# Patient Record
Sex: FEMALE | Race: WHITE | NOT HISPANIC OR LATINO | Employment: STUDENT | URBAN - METROPOLITAN AREA
[De-identification: names, ages, dates, MRNs, and addresses within clinical notes are randomized per-mention and may not be internally consistent; named-entity substitution may affect disease eponyms.]

---

## 2023-05-15 ENCOUNTER — OFFICE VISIT (OUTPATIENT)
Dept: PRIMARY CARE | Facility: CLINIC | Age: 18
End: 2023-05-15
Payer: COMMERCIAL

## 2023-05-15 VITALS
HEART RATE: 99 BPM | BODY MASS INDEX: 20.37 KG/M2 | DIASTOLIC BLOOD PRESSURE: 70 MMHG | SYSTOLIC BLOOD PRESSURE: 124 MMHG | OXYGEN SATURATION: 96 % | WEIGHT: 134 LBS

## 2023-05-15 DIAGNOSIS — B99.9 RECURRENT INFECTIONS: ICD-10-CM

## 2023-05-15 DIAGNOSIS — Z84.89: ICD-10-CM

## 2023-05-15 DIAGNOSIS — R59.9 ENLARGED LYMPH NODES: Primary | ICD-10-CM

## 2023-05-15 DIAGNOSIS — J35.1 ENLARGED TONSILS: ICD-10-CM

## 2023-05-15 DIAGNOSIS — E61.1 IRON DEFICIENCY: ICD-10-CM

## 2023-05-15 LAB
HIV 1/ 2 AG/AB SCREEN: NONREACTIVE
IRON (UG/DL) IN SER/PLAS: 35 UG/DL (ref 35–150)
IRON BINDING CAPACITY (UG/DL) IN SER/PLAS: 397 UG/DL (ref 240–445)
IRON SATURATION (%) IN SER/PLAS: 9 % (ref 25–45)

## 2023-05-15 PROCEDURE — 83550 IRON BINDING TEST: CPT

## 2023-05-15 PROCEDURE — 1036F TOBACCO NON-USER: CPT | Performed by: STUDENT IN AN ORGANIZED HEALTH CARE EDUCATION/TRAINING PROGRAM

## 2023-05-15 PROCEDURE — 83540 ASSAY OF IRON: CPT

## 2023-05-15 PROCEDURE — 87389 HIV-1 AG W/HIV-1&-2 AB AG IA: CPT

## 2023-05-15 PROCEDURE — 99204 OFFICE O/P NEW MOD 45 MIN: CPT | Performed by: STUDENT IN AN ORGANIZED HEALTH CARE EDUCATION/TRAINING PROGRAM

## 2023-05-15 RX ORDER — TRETINOIN 1 MG/G
CREAM TOPICAL
COMMUNITY
Start: 2022-07-20

## 2023-05-15 ASSESSMENT — ENCOUNTER SYMPTOMS
HOARSE VOICE: 1
HEADACHES: 1
NECK PAIN: 1
STRIDOR: 0
SHORTNESS OF BREATH: 0
VOMITING: 0
DIARRHEA: 0
ABDOMINAL PAIN: 0
TROUBLE SWALLOWING: 0
SORE THROAT: 1
COUGH: 1
SWOLLEN GLANDS: 1

## 2023-05-15 ASSESSMENT — PATIENT HEALTH QUESTIONNAIRE - PHQ9
1. LITTLE INTEREST OR PLEASURE IN DOING THINGS: NOT AT ALL
2. FEELING DOWN, DEPRESSED OR HOPELESS: NOT AT ALL
SUM OF ALL RESPONSES TO PHQ9 QUESTIONS 1 AND 2: 0

## 2023-05-15 NOTE — PROGRESS NOTES
Answers submitted by the patient for this visit:  Sore Throat Questionnaire (Submitted on 5/15/2023)  Chief Complaint: Sore throat  Chronicity: recurrent  Onset: more than 1 month ago  Progression since onset: waxing and waning  Pain worse on: right  Fever: no fever  Pain - numeric: 4/10  abdominal pain: No  congestion: Yes  cough: Yes  diarrhea: No  drooling: Yes  ear discharge: No  ear pain: No  headaches: Yes  hoarse voice: Yes  neck pain: Yes  plugged ear sensation: Yes  shortness of breath: No  stridor: No  swollen glands: Yes  trouble swallowing: No  vomiting: No  strep: No  mono: No  Subjective   Patient ID: Jonelle Reeder is a 17 y.o. female who presents for New Patient Visit (New patient est care/Getting sick often with sore throat and swollen lymph nodes/Does have tonsils).  Symptoms started in Jan 2023.     Sore throat, lymph nodes swollen, congestion. Sometimes fevers associated with the congestion. No night sweats. No changes in weight.     Negative for mono, flu, and strep. Many strep infections in the past. Denies hx of tonsil stones or foul breath.     Was sick last week, currently feels congestion, sore throat. Lymph nodes feel back to baseline.     Gets constipated easily. Takes miralax. Has been taking this for many years. Admits to taking this every other day while at school. Having Bms every other day.     Mother reports recent divorce from pt's father and that father was involved in high risk sexual activity including sex with men. She is concerned about possible HIV exposure.         Sore Throat   This is a recurrent problem. The current episode started more than 1 month ago. The problem has been waxing and waning. The pain is worse on the right side. There has been no fever. The pain is at a severity of 4/10. Associated symptoms include congestion, coughing, drooling, headaches, a hoarse voice, a plugged ear sensation, neck pain and swollen glands. Pertinent negatives include no abdominal pain,  diarrhea, ear discharge, ear pain, shortness of breath, stridor, trouble swallowing or vomiting. She has had no exposure to strep or mono.       Review of Systems   HENT:  Positive for congestion, drooling, hoarse voice and sore throat. Negative for ear discharge, ear pain and trouble swallowing.    Respiratory:  Positive for cough. Negative for shortness of breath and stridor.    Gastrointestinal:  Negative for abdominal pain, diarrhea and vomiting.   Musculoskeletal:  Positive for neck pain.   Neurological:  Positive for headaches.       Objective   Physical Exam  Vitals reviewed.   Constitutional:       General: She is not in acute distress.     Appearance: Normal appearance. She is not toxic-appearing.   HENT:      Head: Normocephalic.      Nose: Nose normal. No congestion.      Mouth/Throat:      Mouth: Mucous membranes are moist.      Pharynx: No oropharyngeal exudate or posterior oropharyngeal erythema.      Comments: Enlarged tonsils, hypervascularity on left, no stones or exudates present  Eyes:      Pupils: Pupils are equal, round, and reactive to light.   Cardiovascular:      Rate and Rhythm: Normal rate and regular rhythm.      Heart sounds: No murmur heard.  Pulmonary:      Effort: Pulmonary effort is normal. No respiratory distress.      Breath sounds: Normal breath sounds. No wheezing or rhonchi.   Abdominal:      General: There is no distension.      Palpations: Abdomen is soft.      Tenderness: There is no abdominal tenderness.   Musculoskeletal:      Cervical back: Neck supple.   Lymphadenopathy:      Cervical: No cervical adenopathy.   Skin:     General: Skin is warm and dry.   Neurological:      General: No focal deficit present.      Mental Status: She is alert.   Psychiatric:         Mood and Affect: Mood normal.         Behavior: Behavior normal.           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

## 2023-05-17 ENCOUNTER — TELEPHONE (OUTPATIENT)
Dept: PRIMARY CARE | Facility: CLINIC | Age: 18
End: 2023-05-17
Payer: COMMERCIAL

## 2023-05-17 NOTE — TELEPHONE ENCOUNTER
----- Message from Silvia Walls, DO sent at 5/16/2023  5:16 PM EDT -----  Please see why CBC has not resulted    Iron is low- would recommend adding 65mg iron  3x per week    HIV test is negative

## 2023-11-21 ENCOUNTER — OFFICE VISIT (OUTPATIENT)
Dept: PRIMARY CARE | Facility: CLINIC | Age: 18
End: 2023-11-21
Payer: COMMERCIAL

## 2023-11-21 VITALS
DIASTOLIC BLOOD PRESSURE: 70 MMHG | OXYGEN SATURATION: 100 % | BODY MASS INDEX: 20.46 KG/M2 | HEIGHT: 68 IN | WEIGHT: 135 LBS | HEART RATE: 67 BPM | SYSTOLIC BLOOD PRESSURE: 110 MMHG

## 2023-11-21 DIAGNOSIS — L70.0 ACNE VULGARIS: ICD-10-CM

## 2023-11-21 DIAGNOSIS — Z23 ENCOUNTER FOR IMMUNIZATION: ICD-10-CM

## 2023-11-21 DIAGNOSIS — Z30.09 BIRTH CONTROL COUNSELING: Primary | ICD-10-CM

## 2023-11-21 PROBLEM — J30.9 ALLERGIC RHINITIS: Status: ACTIVE | Noted: 2023-11-21

## 2023-11-21 PROBLEM — J45.20 ASTHMA, MILD INTERMITTENT (HHS-HCC): Status: ACTIVE | Noted: 2023-11-21

## 2023-11-21 PROBLEM — J35.3 ADENOTONSILLAR HYPERTROPHY: Status: ACTIVE | Noted: 2023-11-21

## 2023-11-21 PROBLEM — J03.90 ACUTE TONSILLITIS: Status: RESOLVED | Noted: 2023-11-21 | Resolved: 2023-11-21

## 2023-11-21 PROBLEM — H10.10 ALLERGIC CONJUNCTIVITIS: Status: RESOLVED | Noted: 2023-11-21 | Resolved: 2023-11-21

## 2023-11-21 PROBLEM — J35.8 TONSIL ASYMMETRY: Status: ACTIVE | Noted: 2023-11-21

## 2023-11-21 PROBLEM — R50.9 FEVER: Status: RESOLVED | Noted: 2023-11-21 | Resolved: 2023-11-21

## 2023-11-21 PROBLEM — R09.82 POSTNASAL DRIP: Status: ACTIVE | Noted: 2023-11-21

## 2023-11-21 PROCEDURE — 1036F TOBACCO NON-USER: CPT | Performed by: STUDENT IN AN ORGANIZED HEALTH CARE EDUCATION/TRAINING PROGRAM

## 2023-11-21 PROCEDURE — 90651 9VHPV VACCINE 2/3 DOSE IM: CPT | Performed by: STUDENT IN AN ORGANIZED HEALTH CARE EDUCATION/TRAINING PROGRAM

## 2023-11-21 PROCEDURE — 99213 OFFICE O/P EST LOW 20 MIN: CPT | Performed by: STUDENT IN AN ORGANIZED HEALTH CARE EDUCATION/TRAINING PROGRAM

## 2023-11-21 PROCEDURE — 90471 IMMUNIZATION ADMIN: CPT | Performed by: STUDENT IN AN ORGANIZED HEALTH CARE EDUCATION/TRAINING PROGRAM

## 2023-11-21 RX ORDER — NORGESTIMATE AND ETHINYL ESTRADIOL 7DAYSX3 28
1 KIT ORAL DAILY
Qty: 28 TABLET | Refills: 12 | Status: SHIPPED | OUTPATIENT
Start: 2023-11-21 | End: 2024-11-20

## 2023-11-21 RX ORDER — LINACLOTIDE 72 UG/1
CAPSULE, GELATIN COATED ORAL
COMMUNITY
Start: 2023-11-20

## 2023-11-21 RX ORDER — AZITHROMYCIN 500 MG/1
500 TABLET, FILM COATED ORAL
COMMUNITY
Start: 2023-08-31

## 2023-11-21 ASSESSMENT — ENCOUNTER SYMPTOMS
SLEEP DISTURBANCE: 0
DIZZINESS: 0
HEADACHES: 0
CHEST TIGHTNESS: 0
MUSCULOSKELETAL NEGATIVE: 1
SHORTNESS OF BREATH: 0
FATIGUE: 0
GASTROINTESTINAL NEGATIVE: 1
DYSPHORIC MOOD: 0
ACTIVITY CHANGE: 0

## 2023-11-21 ASSESSMENT — PATIENT HEALTH QUESTIONNAIRE - PHQ9
2. FEELING DOWN, DEPRESSED OR HOPELESS: NOT AT ALL
SUM OF ALL RESPONSES TO PHQ9 QUESTIONS 1 AND 2: 0
1. LITTLE INTEREST OR PLEASURE IN DOING THINGS: NOT AT ALL

## 2023-11-21 NOTE — PROGRESS NOTES
Subjective   Patient ID: Jonelle Reeder is a 18 y.o. female who presents for Acne (Jonelle has been suffering with acne for a long time. See's derm, and has tried accutane and other medications. They recommend she try birth control ).  Wants to go on birth control for her acne. Has been on multiple meds with derm including accutane and currently tretinoin.     7 days long, 3-4 heavier days. Cramping. Has not been on birth control before.     No hx of migraines. Does not smoke.     Due for HPV vaccine.     No other concerns today, doing well.                 Review of Systems   Constitutional:  Negative for activity change and fatigue.   HENT: Negative.     Respiratory:  Negative for chest tightness and shortness of breath.    Cardiovascular:  Negative for chest pain.   Gastrointestinal: Negative.    Musculoskeletal: Negative.    Neurological:  Negative for dizziness and headaches.   Psychiatric/Behavioral:  Negative for dysphoric mood and sleep disturbance.    All other systems reviewed and are negative.      Objective   Physical Exam  Vitals reviewed.   Constitutional:       Appearance: Normal appearance.   Eyes:      Pupils: Pupils are equal, round, and reactive to light.   Cardiovascular:      Rate and Rhythm: Normal rate and regular rhythm.   Pulmonary:      Effort: Pulmonary effort is normal. No respiratory distress.      Breath sounds: No wheezing or rhonchi.   Musculoskeletal:         General: Normal range of motion.   Skin:     Comments: Cystic acne over face   Neurological:      General: No focal deficit present.      Mental Status: She is alert. Mental status is at baseline.   Psychiatric:         Mood and Affect: Mood normal.         Behavior: Behavior normal.         Body mass index is 20.53 kg/m².      Current Outpatient Medications:     azithromycin (Zithromax) 500 mg tablet, Take 1 tablet (500 mg) by mouth., Disp: , Rfl:     Linzess 72 mcg capsule, , Disp: , Rfl:     tretinoin (Retin-A) 0.1 % cream,  Tretinoin 0.1 % External Cream  Quantity: 20  Refills: 0      Start : 20-Jul-2022  Active, Disp: , Rfl:     norgestimate-ethinyl estradioL (Ortho Tri-Cyclen,Trinessa) 0.18/0.215/0.25 mg-35 mcg (28) tablet, Take 1 tablet by mouth once daily., Disp: 28 tablet, Rfl: 12      Assessment/Plan   Diagnoses and all orders for this visit:  Birth control counseling  -     norgestimate-ethinyl estradioL (Ortho Tri-Cyclen,Trinessa) 0.18/0.215/0.25 mg-35 mcg (28) tablet; Take 1 tablet by mouth once daily.  Acne vulgaris  Encounter for immunization  -     HPV 9-valent vaccine (GARDASIL 9)    Follow up annually    Silvia Walls DO

## 2024-11-12 DIAGNOSIS — Z30.09 BIRTH CONTROL COUNSELING: ICD-10-CM

## 2024-11-12 RX ORDER — NORGESTIMATE AND ETHINYL ESTRADIOL 7DAYSX3 28
1 KIT ORAL DAILY
Qty: 28 TABLET | Refills: 0 | Status: SHIPPED | OUTPATIENT
Start: 2024-11-12 | End: 2025-11-12

## 2024-11-26 ENCOUNTER — APPOINTMENT (OUTPATIENT)
Dept: PRIMARY CARE | Facility: CLINIC | Age: 19
End: 2024-11-26
Payer: COMMERCIAL

## 2024-11-26 VITALS
HEIGHT: 67 IN | BODY MASS INDEX: 20.4 KG/M2 | OXYGEN SATURATION: 98 % | SYSTOLIC BLOOD PRESSURE: 100 MMHG | DIASTOLIC BLOOD PRESSURE: 60 MMHG | WEIGHT: 130 LBS | HEART RATE: 116 BPM

## 2024-11-26 DIAGNOSIS — Z01.419 PAP TEST, AS PART OF ROUTINE GYNECOLOGICAL EXAMINATION: ICD-10-CM

## 2024-11-26 DIAGNOSIS — Z30.09 BIRTH CONTROL COUNSELING: ICD-10-CM

## 2024-11-26 DIAGNOSIS — Z13.29 THYROID DISORDER SCREENING: ICD-10-CM

## 2024-11-26 DIAGNOSIS — B35.1 ONYCHOMYCOSIS: ICD-10-CM

## 2024-11-26 DIAGNOSIS — Z00.00 ANNUAL PHYSICAL EXAM: Primary | ICD-10-CM

## 2024-11-26 DIAGNOSIS — K59.00 CONSTIPATION, UNSPECIFIED CONSTIPATION TYPE: ICD-10-CM

## 2024-11-26 PROBLEM — R09.82 POSTNASAL DRIP: Status: RESOLVED | Noted: 2023-11-21 | Resolved: 2024-11-26

## 2024-11-26 PROBLEM — J35.8 TONSIL ASYMMETRY: Status: RESOLVED | Noted: 2023-11-21 | Resolved: 2024-11-26

## 2024-11-26 PROBLEM — J35.3 ADENOTONSILLAR HYPERTROPHY: Status: RESOLVED | Noted: 2023-11-21 | Resolved: 2024-11-26

## 2024-11-26 PROBLEM — J45.20 ASTHMA, MILD INTERMITTENT (HHS-HCC): Status: RESOLVED | Noted: 2023-11-21 | Resolved: 2024-11-26

## 2024-11-26 PROCEDURE — 99395 PREV VISIT EST AGE 18-39: CPT | Performed by: STUDENT IN AN ORGANIZED HEALTH CARE EDUCATION/TRAINING PROGRAM

## 2024-11-26 PROCEDURE — 1036F TOBACCO NON-USER: CPT | Performed by: STUDENT IN AN ORGANIZED HEALTH CARE EDUCATION/TRAINING PROGRAM

## 2024-11-26 PROCEDURE — 3008F BODY MASS INDEX DOCD: CPT | Performed by: STUDENT IN AN ORGANIZED HEALTH CARE EDUCATION/TRAINING PROGRAM

## 2024-11-26 RX ORDER — PLECANATIDE 3 MG/1
TABLET ORAL
COMMUNITY
Start: 2024-11-25

## 2024-11-26 RX ORDER — TERBINAFINE HYDROCHLORIDE 250 MG/1
250 TABLET ORAL DAILY
Qty: 90 TABLET | Refills: 0 | Status: SHIPPED | OUTPATIENT
Start: 2024-11-26 | End: 2025-02-24

## 2024-11-26 RX ORDER — NORGESTIMATE AND ETHINYL ESTRADIOL 7DAYSX3 28
1 KIT ORAL DAILY
Qty: 84 TABLET | Refills: 3 | Status: SHIPPED | OUTPATIENT
Start: 2024-11-26 | End: 2025-11-26

## 2024-11-26 RX ORDER — SPIRONOLACTONE 50 MG/1
TABLET, FILM COATED ORAL
COMMUNITY
Start: 2024-11-14

## 2024-11-26 ASSESSMENT — PROMIS GLOBAL HEALTH SCALE
CARRYOUT_PHYSICAL_ACTIVITIES: COMPLETELY
RATE_AVERAGE_PAIN: 3
RATE_MENTAL_HEALTH: VERY GOOD
CARRYOUT_SOCIAL_ACTIVITIES: VERY GOOD
RATE_SOCIAL_SATISFACTION: EXCELLENT
RATE_PHYSICAL_HEALTH: VERY GOOD
RATE_QUALITY_OF_LIFE: EXCELLENT
RATE_GENERAL_HEALTH: VERY GOOD
EMOTIONAL_PROBLEMS: SOMETIMES
RATE_AVERAGE_FATIGUE: MILD

## 2024-11-26 ASSESSMENT — ENCOUNTER SYMPTOMS
HEADACHES: 0
ACTIVITY CHANGE: 0
SLEEP DISTURBANCE: 0
CHEST TIGHTNESS: 0
DYSPHORIC MOOD: 0
FATIGUE: 0
MUSCULOSKELETAL NEGATIVE: 1
SHORTNESS OF BREATH: 0
DIZZINESS: 0
CONSTIPATION: 1

## 2024-11-26 ASSESSMENT — COLUMBIA-SUICIDE SEVERITY RATING SCALE - C-SSRS
1. IN THE PAST MONTH, HAVE YOU WISHED YOU WERE DEAD OR WISHED YOU COULD GO TO SLEEP AND NOT WAKE UP?: NO
6. HAVE YOU EVER DONE ANYTHING, STARTED TO DO ANYTHING, OR PREPARED TO DO ANYTHING TO END YOUR LIFE?: NO
2. HAVE YOU ACTUALLY HAD ANY THOUGHTS OF KILLING YOURSELF?: NO

## 2024-11-26 NOTE — PROGRESS NOTES
Subjective   Patient ID: Jonelle Reeder is a 19 y.o. female who presents for Annual Exam (Pt is here for annual exam ).  Taking trulance and senna for her constipation. Had a normal colonoscopy in August. Has been on trulance since April. Bowel movements are variable.     Stays really well hydrated.     Periods doing well since being on OCP. No side effects. Not sexually active.     In her sheng year of college. Going well.     Left great toenail with thickening for the last couple months. Paints her own toenails.     No other concerns today.         Review of Systems   Constitutional:  Negative for activity change and fatigue.   HENT: Negative.     Respiratory:  Negative for chest tightness and shortness of breath.    Cardiovascular:  Negative for chest pain.   Gastrointestinal:  Positive for constipation.   Musculoskeletal: Negative.    Neurological:  Negative for dizziness and headaches.   Psychiatric/Behavioral:  Negative for dysphoric mood and sleep disturbance.    All other systems reviewed and are negative.      Objective   Physical Exam  Vitals reviewed.   Constitutional:       General: She is not in acute distress.  HENT:      Head: Normocephalic.      Right Ear: External ear normal.      Left Ear: External ear normal.      Nose: Nose normal.   Eyes:      Extraocular Movements: Extraocular movements intact.      Pupils: Pupils are equal, round, and reactive to light.   Cardiovascular:      Rate and Rhythm: Normal rate and regular rhythm.      Heart sounds: Normal heart sounds.   Pulmonary:      Effort: Pulmonary effort is normal.      Breath sounds: Normal breath sounds.   Abdominal:      Palpations: Abdomen is soft.      Tenderness: There is no abdominal tenderness. There is no guarding.   Musculoskeletal:         General: Normal range of motion.      Cervical back: Normal range of motion and neck supple.   Skin:     General: Skin is warm and dry.      Comments: Left great the thickened toenail    Neurological:      Mental Status: She is alert. Mental status is at baseline.   Psychiatric:         Mood and Affect: Mood normal.         Behavior: Behavior normal.         Body mass index is 20.36 kg/m².      Current Outpatient Medications:     spironolactone (Aldactone) 50 mg tablet, , Disp: , Rfl:     tretinoin (Retin-A) 0.1 % cream, Tretinoin 0.1 % External Cream  Quantity: 20  Refills: 0      Start : 20-Jul-2022  Active, Disp: , Rfl:     Trulance tablet tablet, , Disp: , Rfl:     norgestimate-ethinyl estradioL (Ortho Tri-Cyclen,Trinessa) 0.18/0.215/0.25 mg-35 mcg (28) tablet, Take 1 tablet by mouth once daily., Disp: 84 tablet, Rfl: 3    terbinafine (LamISIL) 250 mg tablet, Take 1 tablet (250 mg) by mouth once daily., Disp: 90 tablet, Rfl: 0    Assessment/Plan   Diagnoses and all orders for this visit:  Annual physical exam  Comments:  referall to gyn for pap  screening labs  UTD on vaccines  Orders:  -     Comprehensive metabolic panel; Future  Birth control counseling  Comments:  doing well on her OCP  Orders:  -     norgestimate-ethinyl estradioL (Ortho Tri-Cyclen,Trinessa) 0.18/0.215/0.25 mg-35 mcg (28) tablet; Take 1 tablet by mouth once daily.  Onychomycosis  Comments:  terbinafine x 90 days  Orders:  -     terbinafine (LamISIL) 250 mg tablet; Take 1 tablet (250 mg) by mouth once daily.  Constipation, unspecified constipation type  Comments:  chronic  is on senna and trulance  seeing GI  Thyroid disorder screening  -     Tsh With Reflex To Free T4 If Abnormal; Future  Pap test, as part of routine gynecological examination  -     Referral to Obstetrics / Gynecology; Future       Follow up as needed    Silvia Walls DO 11/26/24 10:14 AM

## 2024-11-26 NOTE — PATIENT INSTRUCTIONS
[facility]    [address] [place] [address]    Phone number [phone]    Hours  Monday-Thursday: 6:30 am- 6pm  Friday: 6:30 am-4pm  Saturday: 7am-11am

## 2024-11-30 ENCOUNTER — LAB (OUTPATIENT)
Dept: LAB | Facility: LAB | Age: 19
End: 2024-11-30
Payer: COMMERCIAL

## 2024-11-30 DIAGNOSIS — Z13.29 THYROID DISORDER SCREENING: ICD-10-CM

## 2024-11-30 DIAGNOSIS — Z00.00 ANNUAL PHYSICAL EXAM: ICD-10-CM

## 2024-11-30 LAB
ALBUMIN SERPL BCP-MCNC: 4.4 G/DL (ref 3.4–5)
ALP SERPL-CCNC: 48 U/L (ref 33–110)
ALT SERPL W P-5'-P-CCNC: 9 U/L (ref 7–45)
ANION GAP SERPL CALC-SCNC: 13 MMOL/L (ref 10–20)
AST SERPL W P-5'-P-CCNC: 13 U/L (ref 9–39)
BILIRUB SERPL-MCNC: 0.4 MG/DL (ref 0–1.2)
BUN SERPL-MCNC: 12 MG/DL (ref 6–23)
CALCIUM SERPL-MCNC: 9.6 MG/DL (ref 8.6–10.3)
CHLORIDE SERPL-SCNC: 104 MMOL/L (ref 98–107)
CO2 SERPL-SCNC: 25 MMOL/L (ref 21–32)
CREAT SERPL-MCNC: 0.93 MG/DL (ref 0.5–1.05)
EGFRCR SERPLBLD CKD-EPI 2021: 90 ML/MIN/1.73M*2
GLUCOSE SERPL-MCNC: 100 MG/DL (ref 74–99)
POTASSIUM SERPL-SCNC: 3.9 MMOL/L (ref 3.5–5.3)
PROT SERPL-MCNC: 7.1 G/DL (ref 6.4–8.2)
SODIUM SERPL-SCNC: 138 MMOL/L (ref 136–145)
TSH SERPL-ACNC: 1.02 MIU/L (ref 0.44–3.98)

## 2024-11-30 PROCEDURE — 80053 COMPREHEN METABOLIC PANEL: CPT

## 2024-11-30 PROCEDURE — 84443 ASSAY THYROID STIM HORMONE: CPT

## 2024-11-30 PROCEDURE — 36415 COLL VENOUS BLD VENIPUNCTURE: CPT

## 2024-12-20 LAB
NON-UH HIE BUN/CREAT RATIO: 13.3
NON-UH HIE BUN: 12 MG/DL (ref 9–23)
NON-UH HIE CALCIUM: 10.1 MG/DL (ref 8.7–10.4)
NON-UH HIE CALCULATED OSMOLALITY: 278 MOSM/KG (ref 275–295)
NON-UH HIE CHLORIDE: 107 MMOL/L (ref 98–107)
NON-UH HIE CO2, VENOUS: 26 MMOL/L (ref 20–31)
NON-UH HIE CREATININE: 0.9 MG/DL (ref 0.5–0.8)
NON-UH HIE GFR AA: >60
NON-UH HIE GLOMERULAR FILTRATION RATE: >60 ML/MIN/1.73M?
NON-UH HIE GLUCOSE: 105 MG/DL (ref 74–106)
NON-UH HIE K: 4.3 MMOL/L (ref 3.5–5.1)
NON-UH HIE NA: 139 MMOL/L (ref 135–145)

## 2025-02-11 DIAGNOSIS — B35.1 ONYCHOMYCOSIS: ICD-10-CM

## 2025-02-11 RX ORDER — TERBINAFINE HYDROCHLORIDE 250 MG/1
250 TABLET ORAL DAILY
Qty: 30 TABLET | Refills: 1 | Status: SHIPPED | OUTPATIENT
Start: 2025-02-11

## 2025-03-09 DIAGNOSIS — Z30.09 BIRTH CONTROL COUNSELING: ICD-10-CM

## 2025-03-10 RX ORDER — NORGESTIMATE AND ETHINYL ESTRADIOL 7DAYSX3 28
1 KIT ORAL DAILY
Qty: 28 TABLET | Refills: 2 | Status: SHIPPED | OUTPATIENT
Start: 2025-03-10

## 2025-06-09 DIAGNOSIS — Z30.09 BIRTH CONTROL COUNSELING: ICD-10-CM

## 2025-06-09 RX ORDER — NORGESTIMATE AND ETHINYL ESTRADIOL 7DAYSX3 28
1 KIT ORAL DAILY
Qty: 28 TABLET | Refills: 2 | Status: SHIPPED | OUTPATIENT
Start: 2025-06-09

## 2025-09-04 DIAGNOSIS — Z30.09 BIRTH CONTROL COUNSELING: ICD-10-CM

## 2025-09-04 RX ORDER — NORGESTIMATE AND ETHINYL ESTRADIOL 7DAYSX3 28
1 KIT ORAL
Qty: 28 TABLET | Refills: 2 | Status: SHIPPED | OUTPATIENT
Start: 2025-09-04